# Patient Record
Sex: FEMALE | Race: BLACK OR AFRICAN AMERICAN | Employment: FULL TIME | URBAN - METROPOLITAN AREA
[De-identification: names, ages, dates, MRNs, and addresses within clinical notes are randomized per-mention and may not be internally consistent; named-entity substitution may affect disease eponyms.]

---

## 2021-01-01 ENCOUNTER — HOSPITAL ENCOUNTER (EMERGENCY)
Age: 66
End: 2021-01-24
Attending: EMERGENCY MEDICINE | Admitting: EMERGENCY MEDICINE

## 2021-01-01 ENCOUNTER — APPOINTMENT (OUTPATIENT)
Dept: GENERAL RADIOLOGY | Age: 66
End: 2021-01-01
Attending: EMERGENCY MEDICINE

## 2021-01-01 VITALS
SYSTOLIC BLOOD PRESSURE: 72 MMHG | OXYGEN SATURATION: 83 % | DIASTOLIC BLOOD PRESSURE: 61 MMHG | TEMPERATURE: 101.1 F | WEIGHT: 240 LBS

## 2021-01-01 DIAGNOSIS — R50.9 FEVER, UNSPECIFIED FEVER CAUSE: ICD-10-CM

## 2021-01-01 DIAGNOSIS — J96.01 ACUTE RESPIRATORY FAILURE WITH HYPOXIA (HCC): ICD-10-CM

## 2021-01-01 DIAGNOSIS — I46.9 CARDIOPULMONARY ARREST (HCC): Primary | ICD-10-CM

## 2021-01-01 DIAGNOSIS — R57.9 SHOCK (HCC): ICD-10-CM

## 2021-01-01 DIAGNOSIS — E87.20 METABOLIC ACIDOSIS: ICD-10-CM

## 2021-01-01 DIAGNOSIS — R06.02 SOB (SHORTNESS OF BREATH) ON EXERTION: ICD-10-CM

## 2021-01-01 LAB
ALBUMIN SERPL-MCNC: 2.9 G/DL (ref 3.4–5)
ALBUMIN/GLOB SERPL: 0.7 {RATIO} (ref 0.8–1.7)
ALP SERPL-CCNC: 78 U/L (ref 45–117)
ALT SERPL-CCNC: 96 U/L (ref 13–56)
ANION GAP SERPL CALC-SCNC: 14 MMOL/L (ref 3–18)
ARTERIAL PATENCY WRIST A: YES
AST SERPL-CCNC: 136 U/L (ref 10–38)
BASE DEFICIT BLD-SCNC: 20 MMOL/L
BASOPHILS # BLD: 0 K/UL (ref 0–0.1)
BASOPHILS NFR BLD: 0 % (ref 0–2)
BDY SITE: ABNORMAL
BILIRUB SERPL-MCNC: 0.7 MG/DL (ref 0.2–1)
BNP SERPL-MCNC: 3466 PG/ML (ref 0–900)
BODY TEMPERATURE: 98
BUN SERPL-MCNC: 20 MG/DL (ref 7–18)
BUN/CREAT SERPL: 13 (ref 12–20)
CALCIUM SERPL-MCNC: 9.7 MG/DL (ref 8.5–10.1)
CHLORIDE SERPL-SCNC: 106 MMOL/L (ref 100–111)
CK MB CFR SERPL CALC: 2.3 % (ref 0–4)
CK MB SERPL-MCNC: 2.1 NG/ML (ref 5–25)
CK SERPL-CCNC: 93 U/L (ref 26–192)
CO2 SERPL-SCNC: 19 MMOL/L (ref 21–32)
COVID-19 RAPID TEST, COVR: NOT DETECTED
CREAT SERPL-MCNC: 1.6 MG/DL (ref 0.6–1.3)
D DIMER PPP FEU-MCNC: >20 UG/ML(FEU)
DIFFERENTIAL METHOD BLD: ABNORMAL
EOSINOPHIL # BLD: 0.1 K/UL (ref 0–0.4)
EOSINOPHIL NFR BLD: 1 % (ref 0–5)
ERYTHROCYTE [DISTWIDTH] IN BLOOD BY AUTOMATED COUNT: 14 % (ref 11.6–14.5)
ETHANOL SERPL-MCNC: <3 MG/DL (ref 0–3)
GAS FLOW.O2 O2 DELIVERY SYS: ABNORMAL L/MIN
GAS FLOW.O2 SETTING OXYMISER: 20 BPM
GLOBULIN SER CALC-MCNC: 4.2 G/DL (ref 2–4)
GLUCOSE BLD STRIP.AUTO-MCNC: 389 MG/DL (ref 70–110)
GLUCOSE SERPL-MCNC: 360 MG/DL (ref 74–99)
HCO3 BLD-SCNC: 14.6 MMOL/L (ref 22–26)
HCT VFR BLD AUTO: 39.9 % (ref 35–45)
HGB BLD-MCNC: 12.9 G/DL (ref 12–16)
INR PPP: 1.3 (ref 0.8–1.2)
LACTATE BLD-SCNC: 11 MMOL/L (ref 0.4–2)
LYMPHOCYTES # BLD: 4.4 K/UL (ref 0.9–3.6)
LYMPHOCYTES NFR BLD: 25 % (ref 21–52)
MAGNESIUM SERPL-MCNC: 2.5 MG/DL (ref 1.6–2.6)
MCH RBC QN AUTO: 28.4 PG (ref 24–34)
MCHC RBC AUTO-ENTMCNC: 32.3 G/DL (ref 31–37)
MCV RBC AUTO: 87.7 FL (ref 74–97)
MONOCYTES # BLD: 1.2 K/UL (ref 0.05–1.2)
MONOCYTES NFR BLD: 7 % (ref 3–10)
NEUTS SEG # BLD: 11.7 K/UL (ref 1.8–8)
NEUTS SEG NFR BLD: 67 % (ref 40–73)
O2/TOTAL GAS SETTING VFR VENT: 1 %
PCO2 BLD: 88.2 MMHG (ref 35–45)
PEEP RESPIRATORY: 5 CMH2O
PH BLD: 6.83 [PH] (ref 7.35–7.45)
PLATELET # BLD AUTO: 114 K/UL (ref 135–420)
PMV BLD AUTO: 11.3 FL (ref 9.2–11.8)
PO2 BLD: 85 MMHG (ref 80–100)
POTASSIUM SERPL-SCNC: 4.4 MMOL/L (ref 3.5–5.5)
PROT SERPL-MCNC: 7.1 G/DL (ref 6.4–8.2)
PROTHROMBIN TIME: 16 SEC (ref 11.5–15.2)
RBC # BLD AUTO: 4.55 M/UL (ref 4.2–5.3)
SAO2 % BLD: 83 % (ref 92–97)
SARS-COV-2, COV2: NORMAL
SERVICE CMNT-IMP: ABNORMAL
SODIUM SERPL-SCNC: 139 MMOL/L (ref 136–145)
SOURCE, COVRS: NORMAL
SPECIMEN TYPE: ABNORMAL
TOTAL RESP. RATE, ITRR: 20
TROPONIN I BLD-MCNC: 0.39 NG/ML (ref 0–0.08)
TROPONIN I SERPL-MCNC: 0.77 NG/ML (ref 0–0.04)
VENTILATION MODE VENT: ABNORMAL
VOLUME CONTROL IVLC: YES
VT SETTING VENT: 450 ML
WBC # BLD AUTO: 17.5 K/UL (ref 4.6–13.2)

## 2021-01-01 PROCEDURE — 93005 ELECTROCARDIOGRAM TRACING: CPT

## 2021-01-01 PROCEDURE — 99285 EMERGENCY DEPT VISIT HI MDM: CPT

## 2021-01-01 PROCEDURE — 36556 INSERT NON-TUNNEL CV CATH: CPT

## 2021-01-01 PROCEDURE — 85379 FIBRIN DEGRADATION QUANT: CPT

## 2021-01-01 PROCEDURE — 96365 THER/PROPH/DIAG IV INF INIT: CPT

## 2021-01-01 PROCEDURE — 83605 ASSAY OF LACTIC ACID: CPT

## 2021-01-01 PROCEDURE — 80053 COMPREHEN METABOLIC PANEL: CPT

## 2021-01-01 PROCEDURE — 36600 WITHDRAWAL OF ARTERIAL BLOOD: CPT

## 2021-01-01 PROCEDURE — 96367 TX/PROPH/DG ADDL SEQ IV INF: CPT

## 2021-01-01 PROCEDURE — 87040 BLOOD CULTURE FOR BACTERIA: CPT

## 2021-01-01 PROCEDURE — 94002 VENT MGMT INPAT INIT DAY: CPT

## 2021-01-01 PROCEDURE — 74011000258 HC RX REV CODE- 258: Performed by: EMERGENCY MEDICINE

## 2021-01-01 PROCEDURE — 83735 ASSAY OF MAGNESIUM: CPT

## 2021-01-01 PROCEDURE — 87635 SARS-COV-2 COVID-19 AMP PRB: CPT

## 2021-01-01 PROCEDURE — 83880 ASSAY OF NATRIURETIC PEPTIDE: CPT

## 2021-01-01 PROCEDURE — 74011250636 HC RX REV CODE- 250/636: Performed by: EMERGENCY MEDICINE

## 2021-01-01 PROCEDURE — 71045 X-RAY EXAM CHEST 1 VIEW: CPT

## 2021-01-01 PROCEDURE — 85610 PROTHROMBIN TIME: CPT

## 2021-01-01 PROCEDURE — 51702 INSERT TEMP BLADDER CATH: CPT

## 2021-01-01 PROCEDURE — 82803 BLOOD GASES ANY COMBINATION: CPT

## 2021-01-01 PROCEDURE — 82077 ASSAY SPEC XCP UR&BREATH IA: CPT

## 2021-01-01 PROCEDURE — 74011000250 HC RX REV CODE- 250: Performed by: EMERGENCY MEDICINE

## 2021-01-01 PROCEDURE — 82553 CREATINE MB FRACTION: CPT

## 2021-01-01 PROCEDURE — 85025 COMPLETE CBC W/AUTO DIFF WBC: CPT

## 2021-01-01 PROCEDURE — U0003 INFECTIOUS AGENT DETECTION BY NUCLEIC ACID (DNA OR RNA); SEVERE ACUTE RESPIRATORY SYNDROME CORONAVIRUS 2 (SARS-COV-2) (CORONAVIRUS DISEASE [COVID-19]), AMPLIFIED PROBE TECHNIQUE, MAKING USE OF HIGH THROUGHPUT TECHNOLOGIES AS DESCRIBED BY CMS-2020-01-R: HCPCS

## 2021-01-01 PROCEDURE — 92950 HEART/LUNG RESUSCITATION CPR: CPT

## 2021-01-01 PROCEDURE — 82962 GLUCOSE BLOOD TEST: CPT

## 2021-01-01 PROCEDURE — 96375 TX/PRO/DX INJ NEW DRUG ADDON: CPT

## 2021-01-01 PROCEDURE — 84484 ASSAY OF TROPONIN QUANT: CPT

## 2021-01-01 PROCEDURE — 31500 INSERT EMERGENCY AIRWAY: CPT

## 2021-01-01 RX ORDER — EPINEPHRINE 0.1 MG/ML
INJECTION INTRACARDIAC; INTRAVENOUS
Status: DISCONTINUED
Start: 2021-01-01 | End: 2021-01-24 | Stop reason: HOSPADM

## 2021-01-01 RX ORDER — ETOMIDATE 2 MG/ML
INJECTION INTRAVENOUS
Status: COMPLETED | OUTPATIENT
Start: 2021-01-01 | End: 2021-01-01

## 2021-01-01 RX ORDER — NOREPINEPHRINE BIT/0.9 % NACL 8 MG/250ML
.5-16 INFUSION BOTTLE (ML) INTRAVENOUS
Status: DISCONTINUED | OUTPATIENT
Start: 2021-01-01 | End: 2021-01-24 | Stop reason: HOSPADM

## 2021-01-01 RX ORDER — MIDAZOLAM HYDROCHLORIDE 1 MG/ML
5 INJECTION, SOLUTION INTRAMUSCULAR; INTRAVENOUS ONCE
Status: DISCONTINUED | OUTPATIENT
Start: 2021-01-01 | End: 2021-01-24 | Stop reason: HOSPADM

## 2021-01-01 RX ORDER — EPINEPHRINE 0.1 MG/ML
INJECTION INTRACARDIAC; INTRAVENOUS
Status: COMPLETED | OUTPATIENT
Start: 2021-01-01 | End: 2021-01-01

## 2021-01-01 RX ORDER — EPINEPHRINE 1 MG/ML
INJECTION, SOLUTION, CONCENTRATE INTRAVENOUS
Status: DISCONTINUED
Start: 2021-01-01 | End: 2021-01-01 | Stop reason: WASHOUT

## 2021-01-01 RX ORDER — ACETAMINOPHEN 650 MG/1
650 SUPPOSITORY RECTAL
Status: DISCONTINUED | OUTPATIENT
Start: 2021-01-01 | End: 2021-01-24 | Stop reason: HOSPADM

## 2021-01-01 RX ORDER — SODIUM CHLORIDE 0.9 % (FLUSH) 0.9 %
5-10 SYRINGE (ML) INJECTION AS NEEDED
Status: DISCONTINUED | OUTPATIENT
Start: 2021-01-01 | End: 2021-01-24 | Stop reason: HOSPADM

## 2021-01-01 RX ORDER — CALCIUM CHLORIDE INJECTION 100 MG/ML
INJECTION, SOLUTION INTRAVENOUS
Status: COMPLETED | OUTPATIENT
Start: 2021-01-01 | End: 2021-01-01

## 2021-01-01 RX ORDER — SODIUM BICARBONATE 1 MEQ/ML
SYRINGE (ML) INTRAVENOUS
Status: COMPLETED | OUTPATIENT
Start: 2021-01-01 | End: 2021-01-01

## 2021-01-01 RX ORDER — LEVOFLOXACIN 5 MG/ML
750 INJECTION, SOLUTION INTRAVENOUS EVERY 24 HOURS
Status: DISCONTINUED | OUTPATIENT
Start: 2021-01-01 | End: 2021-01-24 | Stop reason: HOSPADM

## 2021-01-01 RX ORDER — NOREPINEPHRINE BIT/0.9 % NACL 8 MG/250ML
INFUSION BOTTLE (ML) INTRAVENOUS
Status: DISCONTINUED
Start: 2021-01-01 | End: 2021-01-24 | Stop reason: HOSPADM

## 2021-01-01 RX ADMIN — SODIUM CHLORIDE 1000 ML: 900 INJECTION, SOLUTION INTRAVENOUS at 19:21

## 2021-01-01 RX ADMIN — EPINEPHRINE 1 MG: 0.1 INJECTION, SOLUTION ENDOTRACHEAL; INTRACARDIAC; INTRAVENOUS at 19:50

## 2021-01-01 RX ADMIN — EPINEPHRINE 1 MG: 0.1 INJECTION, SOLUTION ENDOTRACHEAL; INTRACARDIAC; INTRAVENOUS at 20:45

## 2021-01-01 RX ADMIN — SODIUM BICARBONATE: 84 INJECTION, SOLUTION INTRAVENOUS at 20:40

## 2021-01-01 RX ADMIN — ETOMIDATE 20 MCG: 2 INJECTION, SOLUTION INTRAVENOUS at 19:33

## 2021-01-01 RX ADMIN — SODIUM BICARBONATE 100 MEQ: 84 INJECTION INTRAVENOUS at 19:52

## 2021-01-01 RX ADMIN — ALTEPLASE 50 MG: KIT at 21:03

## 2021-01-01 RX ADMIN — Medication 5 MCG/MIN: at 20:05

## 2021-01-01 RX ADMIN — EPINEPHRINE 1 MG: 0.1 INJECTION, SOLUTION ENDOTRACHEAL; INTRACARDIAC; INTRAVENOUS at 20:27

## 2021-01-01 RX ADMIN — EPINEPHRINE 1 MG: 0.1 INJECTION, SOLUTION ENDOTRACHEAL; INTRACARDIAC; INTRAVENOUS at 21:32

## 2021-01-01 RX ADMIN — CALCIUM CHLORIDE 1 G: 100 INJECTION, SOLUTION INTRAVENOUS at 19:32

## 2021-01-01 RX ADMIN — EPINEPHRINE 1 MG: 0.1 INJECTION, SOLUTION ENDOTRACHEAL; INTRACARDIAC; INTRAVENOUS at 20:30

## 2021-01-01 RX ADMIN — Medication 7 MCG/MIN: at 20:29

## 2021-01-01 RX ADMIN — LEVOFLOXACIN 750 MG: 5 INJECTION, SOLUTION INTRAVENOUS at 20:00

## 2021-01-01 RX ADMIN — EPINEPHRINE 1 MG: 0.1 INJECTION, SOLUTION ENDOTRACHEAL; INTRACARDIAC; INTRAVENOUS at 21:18

## 2021-01-01 RX ADMIN — Medication 8 MCG/MIN: at 20:48

## 2021-01-01 RX ADMIN — EPINEPHRINE 1 MG: 0.1 INJECTION, SOLUTION ENDOTRACHEAL; INTRACARDIAC; INTRAVENOUS at 19:53

## 2021-01-01 RX ADMIN — EPINEPHRINE 1 MG: 0.1 INJECTION, SOLUTION ENDOTRACHEAL; INTRACARDIAC; INTRAVENOUS at 20:14

## 2021-01-01 RX ADMIN — EPINEPHRINE 1 MG: 0.1 INJECTION, SOLUTION ENDOTRACHEAL; INTRACARDIAC; INTRAVENOUS at 21:29

## 2021-01-01 RX ADMIN — EPINEPHRINE 1 MG: 0.1 INJECTION, SOLUTION ENDOTRACHEAL; INTRACARDIAC; INTRAVENOUS at 20:11

## 2021-01-01 RX ADMIN — EPINEPHRINE 1 MG: 0.1 INJECTION, SOLUTION ENDOTRACHEAL; INTRACARDIAC; INTRAVENOUS at 21:15

## 2021-01-01 RX ADMIN — ALTEPLASE 50 MG: KIT at 21:20

## 2021-01-01 RX ADMIN — EPINEPHRINE 1 MG: 0.1 INJECTION, SOLUTION ENDOTRACHEAL; INTRACARDIAC; INTRAVENOUS at 19:35

## 2021-01-01 RX ADMIN — SODIUM BICARBONATE 50 MEQ: 84 INJECTION INTRAVENOUS at 19:31

## 2021-01-01 RX ADMIN — EPINEPHRINE 1 MG: 0.1 INJECTION, SOLUTION ENDOTRACHEAL; INTRACARDIAC; INTRAVENOUS at 19:30

## 2021-01-01 RX ADMIN — EPINEPHRINE 1 MCG/MIN: 1 INJECTION INTRAMUSCULAR; INTRAVENOUS; SUBCUTANEOUS at 20:37

## 2021-01-01 RX ADMIN — EPINEPHRINE 1 MG: 0.1 INJECTION, SOLUTION ENDOTRACHEAL; INTRACARDIAC; INTRAVENOUS at 21:42

## 2021-01-23 PROBLEM — I46.9 CARDIOPULMONARY ARREST (HCC): Status: ACTIVE | Noted: 2021-01-01

## 2021-01-23 PROBLEM — R65.21 SEPSIS WITH ACUTE ORGAN DYSFUNCTION AND SEPTIC SHOCK (HCC): Status: ACTIVE | Noted: 2021-01-01

## 2021-01-23 PROBLEM — R57.9 SHOCK (HCC): Status: ACTIVE | Noted: 2021-01-01

## 2021-01-23 PROBLEM — A41.9 SEPSIS WITH ACUTE ORGAN DYSFUNCTION AND SEPTIC SHOCK (HCC): Status: ACTIVE | Noted: 2021-01-01

## 2021-01-23 PROBLEM — R50.9 FEVER: Status: ACTIVE | Noted: 2021-01-01

## 2021-01-23 PROBLEM — E87.20 METABOLIC ACIDOSIS: Status: ACTIVE | Noted: 2021-01-01

## 2021-01-23 PROBLEM — R06.02 SOB (SHORTNESS OF BREATH) ON EXERTION: Status: ACTIVE | Noted: 2021-01-01

## 2021-01-23 PROBLEM — J96.01 ACUTE RESPIRATORY FAILURE WITH HYPOXIA (HCC): Status: ACTIVE | Noted: 2021-01-01

## 2021-01-24 LAB
ATRIAL RATE: 131 BPM
ATRIAL RATE: 148 BPM
ATRIAL RATE: 153 BPM
CALCULATED P AXIS, ECG09: 45 DEGREES
CALCULATED P AXIS, ECG09: 60 DEGREES
CALCULATED P AXIS, ECG09: 65 DEGREES
CALCULATED R AXIS, ECG10: -45 DEGREES
CALCULATED R AXIS, ECG10: -47 DEGREES
CALCULATED R AXIS, ECG10: -57 DEGREES
CALCULATED T AXIS, ECG11: 19 DEGREES
CALCULATED T AXIS, ECG11: 22 DEGREES
CALCULATED T AXIS, ECG11: 8 DEGREES
DIAGNOSIS, 93000: NORMAL
P-R INTERVAL, ECG05: 118 MS
P-R INTERVAL, ECG05: 122 MS
P-R INTERVAL, ECG05: 140 MS
Q-T INTERVAL, ECG07: 260 MS
Q-T INTERVAL, ECG07: 306 MS
Q-T INTERVAL, ECG07: 322 MS
QRS DURATION, ECG06: 126 MS
QRS DURATION, ECG06: 142 MS
QRS DURATION, ECG06: 146 MS
QTC CALCULATION (BEZET), ECG08: 415 MS
QTC CALCULATION (BEZET), ECG08: 451 MS
QTC CALCULATION (BEZET), ECG08: 505 MS
VENTRICULAR RATE, ECG03: 131 BPM
VENTRICULAR RATE, ECG03: 148 BPM
VENTRICULAR RATE, ECG03: 153 BPM

## 2021-01-24 NOTE — ED PROVIDER NOTES
EMERGENCY DEPARTMENT HISTORY AND PHYSICAL EXAM 
 
Date: 1/23/2021 Patient Name: Deanne Robins History of Presenting Illness Chief Complaint Patient presents with  
 Other History Provided By: Patient and EMS Deanne Robins is a 63 y.o. female who presents to the emergency department C/O generalized weakness. Patient provided by EMS for this problem. They state the patient was not providing much information but that the family said she was feeling more weak over the last day. She did state to EMS that she was having sob with walking. Their initial EKG showed potential concern for a STEMI and she was significantly tachycardic and hypotensive. States they were having difficulty obtaining a temperature as her skin was very diaphoretic and cool to touch. At this time the patient does not provide much history as she states she does not want to talk. She states she is short of breath and her mouth is very dry and wants some water. She denies any drug or alcohol use PCP: Monica Aldana DO Past History Past Medical History: 
Past Medical History:  
Diagnosis Date  Hypertension Past Surgical History: 
History reviewed. No pertinent surgical history. Family History: 
History reviewed. No pertinent family history. Social History: 
Social History Tobacco Use  Smoking status: Never Smoker  Smokeless tobacco: Never Used Substance Use Topics  Alcohol use: Not Currently  Drug use: Not on file Allergies: 
No Known Allergies Review of Systems Review of Systems Unable to perform ROS: Mental status change HENT:  
     Dry mouth Respiratory: Positive for shortness of breath. Cardiovascular: Negative for chest pain. Musculoskeletal: Positive for back pain. All other systems reviewed and are negative. All other systems reviewed and are negative. Physical Exam  
 
Vitals: 01/23/21 2136 01/23/21 2140 01/23/21 2152 01/23/21 2156 BP:  (!) 72/61 Pulse: (!) 109 90 (!) 0 (!) 0 Resp:  15  (!) 0 Temp:      
SpO2:  (!) 83% Weight:      
 
Physical Exam 
 
Nursing notes and vital signs reviewed Airway: intact Breathing: Severe distress, no cyanosis Circulation: Peripheral pulses thready but equal bilaterally Constitutional: Patient is toxic appearing in severe distress, obese appearing stated age HEENT: 
Head: Normocephalic, Atraumatic Eyes: PERRL, EOMI, No conjunctival injection Ears: external ears normal 
Nose: No rhinorrhea, external nose normal 
Throat: mucous membranes extremely dry Neck: symmetric, trachea midline, no obvious swelling, mild JVD Cardiovascular: Significant tachycardia, regular rhythm, no murmurs Lungs: Clear to ausculation bilaterally, No stridor, severe increased work of breathing and chest excursion bilaterally Abdomen: Soft, non tender, non distended, normoactive bowel sounds, No rigidity, no peritoneal signs Musculoskeletal: No evidence of obvious deformity to the back, neck or extremities, no LE edema Skin: cool extremities, diaphoretic, No obvious rashes Neuro: Patient appears agitated, patient is awake and oriented to person and place, CN 2-12 intact, normal speech, strength and sensation full and symmetric bilaterally Psychiatric: Patient has a guarded affect Diagnostic Study Results Labs - Recent Results (from the past 72 hour(s)) EKG, 12 LEAD, INITIAL Collection Time: 01/23/21  6:59 PM  
Result Value Ref Range Ventricular Rate 153 BPM  
 Atrial Rate 153 BPM  
 P-R Interval 118 ms QRS Duration 126 ms  
 Q-T Interval 260 ms QTC Calculation (Bezet) 415 ms Calculated P Axis 65 degrees Calculated R Axis -57 degrees Calculated T Axis 8 degrees Diagnosis Sinus tachycardia Left axis deviation Right bundle branch block Abnormal ECG No previous ECGs available CBC WITH AUTOMATED DIFF  
 Collection Time: 01/23/21  7:00 PM  
Result Value Ref Range WBC 17.5 (H) 4.6 - 13.2 K/uL  
 RBC 4.55 4.20 - 5.30 M/uL  
 HGB 12.9 12.0 - 16.0 g/dL HCT 39.9 35.0 - 45.0 % MCV 87.7 74.0 - 97.0 FL  
 MCH 28.4 24.0 - 34.0 PG  
 MCHC 32.3 31.0 - 37.0 g/dL  
 RDW 14.0 11.6 - 14.5 % PLATELET 952 (L) 682 - 420 K/uL MPV 11.3 9.2 - 11.8 FL  
 NEUTROPHILS 67 40 - 73 % LYMPHOCYTES 25 21 - 52 % MONOCYTES 7 3 - 10 % EOSINOPHILS 1 0 - 5 % BASOPHILS 0 0 - 2 %  
 ABS. NEUTROPHILS 11.7 (H) 1.8 - 8.0 K/UL  
 ABS. LYMPHOCYTES 4.4 (H) 0.9 - 3.6 K/UL  
 ABS. MONOCYTES 1.2 0.05 - 1.2 K/UL  
 ABS. EOSINOPHILS 0.1 0.0 - 0.4 K/UL  
 ABS. BASOPHILS 0.0 0.0 - 0.1 K/UL  
 DF AUTOMATED METABOLIC PANEL, COMPREHENSIVE Collection Time: 01/23/21  7:00 PM  
Result Value Ref Range Sodium 139 136 - 145 mmol/L Potassium 4.4 3.5 - 5.5 mmol/L Chloride 106 100 - 111 mmol/L  
 CO2 19 (L) 21 - 32 mmol/L Anion gap 14 3.0 - 18 mmol/L Glucose 360 (H) 74 - 99 mg/dL BUN 20 (H) 7.0 - 18 MG/DL Creatinine 1.60 (H) 0.6 - 1.3 MG/DL  
 BUN/Creatinine ratio 13 12 - 20 GFR est AA 39 (L) >60 ml/min/1.73m2 GFR est non-AA 33 (L) >60 ml/min/1.73m2 Calcium 9.7 8.5 - 10.1 MG/DL Bilirubin, total 0.7 0.2 - 1.0 MG/DL  
 ALT (SGPT) 96 (H) 13 - 56 U/L  
 AST (SGOT) 136 (H) 10 - 38 U/L Alk. phosphatase 78 45 - 117 U/L Protein, total 7.1 6.4 - 8.2 g/dL Albumin 2.9 (L) 3.4 - 5.0 g/dL Globulin 4.2 (H) 2.0 - 4.0 g/dL A-G Ratio 0.7 (L) 0.8 - 1.7 D DIMER Collection Time: 01/23/21  7:00 PM  
Result Value Ref Range D DIMER >20.00 (H) <0.46 ug/ml(FEU) PROTHROMBIN TIME + INR Collection Time: 01/23/21  7:00 PM  
Result Value Ref Range Prothrombin time 16.0 (H) 11.5 - 15.2 sec INR 1.3 (H) 0.8 - 1.2 NT-PRO BNP Collection Time: 01/23/21  7:00 PM  
Result Value Ref Range NT pro-BNP 3,466 (H) 0 - 900 PG/ML  
ETHYL ALCOHOL  Collection Time: 01/23/21  7:00 PM  
 Result Value Ref Range ALCOHOL(ETHYL),SERUM <3 0 - 3 MG/DL MAGNESIUM Collection Time: 01/23/21  7:00 PM  
Result Value Ref Range Magnesium 2.5 1.6 - 2.6 mg/dL CARDIAC PANEL,(CK, CKMB & TROPONIN) Collection Time: 01/23/21  7:00 PM  
Result Value Ref Range CK - MB 2.1 <3.6 ng/ml CK-MB Index 2.3 0.0 - 4.0 % CK 93 26 - 192 U/L Troponin-I, QT 0.77 (H) 0.0 - 0.045 NG/ML  
POC TROPONIN-I Collection Time: 01/23/21  7:01 PM  
Result Value Ref Range Troponin-I (POC) 0.39 (HH) 0.00 - 0.08 ng/mL GLUCOSE, POC Collection Time: 01/23/21  7:22 PM  
Result Value Ref Range Glucose (POC) 389 (H) 70 - 110 mg/dL POC LACTIC ACID Collection Time: 01/23/21  7:29 PM  
Result Value Ref Range Lactic Acid (POC) 11.00 (HH) 0.40 - 2.00 mmol/L  
EKG, 12 LEAD, SUBSEQUENT Collection Time: 01/23/21  7:46 PM  
Result Value Ref Range Ventricular Rate 148 BPM  
 Atrial Rate 148 BPM  
 P-R Interval 122 ms QRS Duration 142 ms  
 Q-T Interval 322 ms QTC Calculation (Bezet) 505 ms Calculated P Axis 60 degrees Calculated R Axis -45 degrees Calculated T Axis 22 degrees Diagnosis Sinus tachycardia Left axis deviation Right bundle branch block T wave abnormality, consider inferior ischemia Abnormal ECG When compared with ECG of 23-JAN-2021 18:59, 
ST no longer depressed in Anterior leads EKG, 12 LEAD, SUBSEQUENT Collection Time: 01/23/21  8:18 PM  
Result Value Ref Range Ventricular Rate 131 BPM  
 Atrial Rate 131 BPM  
 P-R Interval 140 ms QRS Duration 146 ms  
 Q-T Interval 306 ms QTC Calculation (Bezet) 451 ms Calculated P Axis 45 degrees Calculated R Axis -47 degrees Calculated T Axis 19 degrees Diagnosis Sinus tachycardia Right bundle branch block Left anterior fascicular block Bifascicular block Abnormal ECG When compared with ECG of 23-JAN-2021 19:46, No significant change was found POC G3  
 Collection Time: 01/23/21  8:22 PM  
Result Value Ref Range Device: VENT    
 FIO2 (POC) 1.0 %  
 pH (POC) 6.83 (LL) 7.35 - 7.45    
 pCO2 (POC) 88.2 (H) 35.0 - 45.0 MMHG  
 pO2 (POC) 85 80 - 100 MMHG  
 HCO3 (POC) 14.6 (L) 22 - 26 MMOL/L  
 sO2 (POC) 83 (L) 92 - 97 % Base deficit (POC) 20 mmol/L Mode ASSIST CONTROL Tidal volume 450 ml Set Rate 20 bpm  
 PEEP/CPAP (POC) 5 cmH2O Allens test (POC) YES Total resp. rate 20 Site RIGHT RADIAL Patient temp. 98.0 Specimen type (POC) ARTERIAL Performed by Gokul Gaytan Volume control YES    
SARS-COV-2 Collection Time: 01/23/21  9:00 PM  
Result Value Ref Range SARS-CoV-2 Please find results under separate order COVID-19 RAPID TEST Collection Time: 01/23/21  9:00 PM  
Result Value Ref Range Specimen source Nasopharyngeal    
 COVID-19 rapid test Not detected NOTD Radiologic Studies -  
XR CHEST PORT Final Result New right internal jugular central line with tip overlying the  
region the superior vena cava. Patient is rotated. There is prominence of the right hilum concerning for underlying adenopathy or  
mass. Advise CT of the chest for further evaluation. No pneumothorax. XR CHEST PORT Final Result No acute process CT Results  (Last 48 hours) None CXR Results  (Last 48 hours) 01/23/21 2033  XR CHEST PORT Final result Impression:  New right internal jugular central line with tip overlying the  
region the superior vena cava. Patient is rotated. There is prominence of the right hilum concerning for underlying adenopathy or  
mass. Advise CT of the chest for further evaluation. No pneumothorax. Narrative:  EXAM:  AP Portable Chest X-ray 1 view INDICATION: Central line placement COMPARISON: January 23, 2021 at 7:38 PM  
   
_______________ FINDINGS: Patient is rotated to the right. There is a right internal jugular  
central line with tip overlying the junction of the superior vena cava. There is  
an endotracheal tube overlying the trachea with tip at the level of the  
clavicles. Heart size appears enlarged partly due to AP magnification. There is enlargement  
of the right hilum concerning for adenopathy or underlying mass. Advise CT of  
the chest for further evaluation. Lungs are clear of active disease. There are  
no pleural effusions. No acute osseous findings. ________________  
   
   
  
 01/23/21 2002  XR CHEST PORT Final result Impression:  No acute process Narrative:  EXAM:  AP Portable Chest X-ray 1 view INDICATION: EKG showed STEMI  
   
COMPARISON: None  
   
_______________ FINDINGS: There is an endotracheal tube overlying the trachea 1.7 cm above the  
steve. There is a patch overlying the right hemithorax. Patient is rotated to  
the right. Heart and mediastinal contours are within normal limits for portable radiograph. Lungs are clear of active disease. There are no pleural effusions. No acute  
osseous findings. ________________ Medications given in the ED- Medications  
acetaminophen (TYLENOL) suppository 650 mg (has no administration in time range)  
sodium chloride (NS) flush 5-10 mL (has no administration in time range)  
sodium chloride 0.9 % bolus infusion 1,000 mL (0 mL IntraVENous IV Completed 1/23/21 2021) Followed by  
sodium chloride 0.9 % bolus infusion 1,000 mL (has no administration in time range) Followed by  
sodium chloride 0.9 % bolus infusion 1,000 mL (has no administration in time range) Followed by  
sodium chloride 0.9 % bolus infusion 267 mL (has no administration in time range)  
piperacillin-tazobactam (ZOSYN) 4.5 g in 0.9% sodium chloride (MBP/ADV) 100 mL MBP (has no administration in time range)   Followed by  
 piperacillin-tazobactam (ZOSYN) 4.5 g in 0.9% sodium chloride (MBP/ADV) 100 mL MBP (has no administration in time range) levoFLOXacin (LEVAQUIN) 750 mg in D5W IVPB (0 mg IntraVENous Stopped 1/23/21 2130)  
sodium bicarbonate (8.4%) 150 mEq in 0.45% sodium chloride 1,000 mL infusion ( IntraVENous Stopped 1/23/21 2154) midazolam (PF) (VERSED) injection 5 mg (has no administration in time range) dexmedeTOMidine (PRECEDEX) 400 mcg in 0.9% sodium chloride 100 mL infusion (has no administration in time range) NOREPINephrine (LEVOPHED) 8 mg in 0.9% NS 250ml infusion (0 mcg/min IntraVENous Stopped 1/23/21 2154) alteplase (ACTIVASE) infusion for PE or AMI (has no administration in time range) EPINEPHrine (ADRENALIN) 8 mg in 0.9% sodium chloride 250 mL infusion (0 mcg/min IntraVENous Stopped 1/23/21 2154) EPINEPHrine (ADRENALIN) 0.1 mg/mL syringe (1 mg IntraVENous Given 1/23/21 2118)  
sodium bicarbonate 8.4 % (1 mEq/mL) injection (100 mEq IntraVENous Given 1/23/21 1952) etomidate (AMIDATE) 2 mg/mL injection (20 mcg IntraVENous Given 1/23/21 1933) calcium chloride injection (1 g IntraVENous Given 1/23/21 1932) alteplase (ACTIVASE) infusion for PE or AMI (  Stopped 1/23/21 2154) EPINEPHrine (ADRENALIN) 0.1 mg/mL syringe (1 mg IntraVENous Given 1/23/21 2142) Medical Decision Making I reviewed the vital signs, available nursing notes, past medical history, past surgical history, family history and social history. Vital Signs interpretation- I have reviewed the patient's vital signs. Pulse Oximetry interpretation - 88% on 100% fio2 NRB although the pulse ox is difficult to measure at this time. Will obtain ABG Cardiac Monitor interpretation: 
Rate: 153 bpm 
Rhythm: sinu EKG interpretation: (Preliminary) EKG interpretation by Dr. Karma Looney Rate 153 sinus tachycardia, left axis deviation, right bundle branch block, nonspecific ST changes with depressions in the lateral and inferior leads and some elevation in the anterior Records Reviewed: Nursing Notes and Old Medical Records Procedures: 
Critical Care Performed by: Jesse Davis DO Authorized by: Jesse Davis DO  
 
Critical care provider statement:  
  Critical care time (minutes):  180 Critical care start time:  1/23/2021 7:00 PM 
  Critical care end time:  1/23/2021 10:00 PM 
  Critical care time was exclusive of:  Separately billable procedures and treating other patients Critical care was necessary to treat or prevent imminent or life-threatening deterioration of the following conditions:  Cardiac failure, respiratory failure, shock and metabolic crisis Critical care was time spent personally by me on the following activities:  Blood draw for specimens, development of treatment plan with patient or surrogate, ordering and performing treatments and interventions, ordering and review of laboratory studies, ordering and review of radiographic studies, pulse oximetry, re-evaluation of patient's condition, ventilator management, obtaining history from patient or surrogate, examination of patient, evaluation of patient's response to treatment and discussions with consultants Central Line 
 
Date/Time: 1/23/2021 11:21 PM 
Performed by: Jesse Davis DO Authorized by: Chaitanya Fernandez Consent:  
  Consent obtained:  Emergent situation Pre-procedure details:  
  Hand hygiene: Hand hygiene performed prior to insertion Sterile barrier technique: All elements of maximal sterile technique followed Skin preparation:  ChloraPrep Procedure details: Location:  R internal jugular Patient position:  Flat Procedural supplies:  Triple lumen Catheter size:  7.5 Fr Landmarks identified: yes Ultrasound guidance: yes Sterile ultrasound techniques: Sterile gel and sterile probe covers were used Number of attempts:  1 Successful placement: yes Post-procedure details: Post-procedure:  Dressing applied and line sutured Assessment:  Blood return through all ports, no pneumothorax on x-ray and placement verified by x-ray Patient tolerance of procedure: Tolerated well, no immediate complications Intubation Date/Time: 1/23/2021 11:21 PM 
Performed by: Governor Elsy DO Authorized by: Alberta Regalado Consent:  
  Consent obtained:  Emergent situation Pre-procedure details:  
  Patient status:  Unresponsive Paralytics:  None Procedure details:  
  Preoxygenation:  Bag valve mask CPR in progress: yes Intubation method:  Oral 
  Oral intubation technique:  Video-assisted Laryngoscope blade: Mac 4 Tube size (mm):  7.5 Tube type:  Cuffed Number of attempts:  2 Ventilation between attempts: yes Cricoid pressure: yes Tube visualized through cords: yes Placement assessment: ETT to lip:  23 ETT to teeth:  22 Tube secured with:  ETT sharma Breath sounds:  Equal 
  Placement verification: chest rise, condensation, CXR verification and ETCO2 detector Chest x-ray findings: ET tube slightly superior, it was pushed down 2 cm. Post-procedure details:  
  Patient tolerance of procedure: Tolerated well, no immediate complications Bedside US Date/Time: 1/23/2021 11:23 PM 
Performed by: Governor Elsy DO Authorized by: Alberta Regalado Emergent situation Performed by: Attending Type of procedure: Focused cardiac (Echo) Left leg: Indications:  Cardiac arrest 
  Parasternal long axis:  Limited Parasternal short axis:  Limited Apical four-chamber:  Limited Pericardial effusion:  Absent Global Ventricular Function:  Severely reduced Right Ventricular Size:  Indeterminate Cardioversion, electrical 
 
 Date/Time: 1/23/2021 11:24 PM 
Performed by: Elvia Shrestha DO Authorized by: Dinora Gaytan Consent:  
  Consent obtained:  Emergent situation Pre-procedure details:  
  Cardioversion basis:  Emergent Rhythm:  Ventricular tachycardia Electrode placement:  Anterior-lateral 
Attempt one:  
  Cardioversion mode:  Asynchronous Waveform:  Biphasic Shock (Joules):  200 Shock outcome:  Conversion to pulseless electrical activity Post-procedure details:  
  Patient status:  Unresponsive ABG Date/Time: 1/23/2021 11:24 PM 
Performed by: Elvia Shrestha DO Authorized by: Elvia Shrestha DO  
 
 
 
 
ED Course & MDM:  
initially code stemi was called by the physician prior to my arrival and discussion was made with cardiology who evaluated the EKG and determined that she was not in an acute STEMI based on their EKG interpretation and the lack of her chest pain. There was difficulty obtaining good temperature due to her cool extremities. A rectal temperature was done for temperature of 101. Combined with her tachycardia code sepsis was initiated. Will give 30 cc/kg fluid bolus. We will start broad-spectrum antibiotics vancomycin Zosyn and Levaquin and keep the patient on supplemental oxygen. Potential differentials for this patient include but not limited to pulmonary embolism, aortic dissection or aneurysm, NSTEMI, heart failure, toxin/drug induced, infection. Initial troponin POC noted to be 0.39. At 1928 patient became unresponsive. She had agonal breathing and her heart rate became significantly more bradycardic rate between 60 and 70 no pulses were palpated in the carotid or femoral region. ACLS protocol was initiated 1930 1 mg epi given 1931 1 amp of bicarb given 1932 1 g of calcium given 1934 patient's rhythm changed from PEA, sinus tach around 130, to V. tach. She was defibrillated at 200 J with return to PEA sinus tach 1933 20 mg of etomidate given for intubation 1935 1 mg of epinephrine given Intubation was completed at 1111 [facility] after multiple attempts 1936 return of spontaneous circulation at sinus tach EKG #2 shows rate 148 sinus tachycardia with left axis deviation right bundle branch block and the similar ST changes with depressions in lateral and inferior leads with nonspecific elevation in aVR She is on 100% FiO2 and PEEP of 10 with a rate of 30. Her ABG shows significant acidosis. 1950 patient started to become hypoxic bradycardic and hypotensive 1952 1 amp of bicarb given 1953 1 mg epinephrine push 1954 pulses were lost and ACLS protocol was initiated, PEA arrest noted 1955 1 mg epinephrine push 1957 return of spontaneous circulation and sinus tachycardia Central line was placed and patient was started on norepinephrine drip 2011 similar process occurred and pulses were lost ACLS protocol was initiated with PEA arrest, 1 mg of epinephrine given push 2014 1 mg of epinephrine given push 2015 return spontaneous circulation sinus tach 
 
 
2026 patient lost pulses again, similar appearance where she would become more hypoxic/bradycardic and hypotensive. The ET tube is in appropriate position based on chest x-ray and during bag mask ventilation we were able to get her pulse ox to improve with chest compressions but did not seem to last 
2027 1 mg epinephrine given 2029 Levophed drip increased to 7 mcg 2030 1mg epinephrine given 2030 return of spontaneous circulation sinus tachycardia 2037 patient was started on an epinephrine drip and bicarbonate drip started at 2040 Patient lost pulses again at 2044 ACLS protocol was initiated at PEA arrest 
2045 1 mg epinephrine given and return of spontaneous circulation was achieved 2048 Levophed drip increased as well as epinephrine drip increased for her persistent hypotension CONSULT NOTE:  
Dr. Rob Piper spoke with Dr. Mona Santana Specialty: pulm cc Discussed pt's hx, disposition, and available diagnostic and imaging results over the telephone. Reviewed care plans. Stated it would be difficult to make the call to give TPA for potential PE given the lack of information. At this time I had a long talk with the patient's son Valerie Zavaleta at bedside. Stated at this point the patient is on high amounts of life support including ventilation, bicarbonate drip, Levophed drip, epinephrine drip. I did state that I was potentially concerned if the patient could be having a massive pulmonary embolism and did discuss with him the potential treatment of giving TPA. He is agreeable with whatever treatment we deem necessary for his mother Pulses were lost again at 2101 ACLS protocol was initiated with  PEA arrest 
2103 TPA was given 50 mg over 2 minutes 2106 return of spontaneous circulation with sinus tachycardia 2112 epinephrine drip increased due to persistent hypotension 2112 pulses were lost again and ACLS protocol was initiated. During these times it was difficult to maintain adequate oxygenation on ventilator. During bag mask ventilation we were able to get it to may be mid 80% although it was difficult to get a good read on the pulse oximeter. 2115 epinephrine given 1 mg push 
2118 1 mg epinephrine push 
2120 the second dose 50 mg TPA was started to be given over 1 hour 2120 return of spontaneous circulation at sinus tachycardia 2029 pulses were lost again ACLS protocol was initiated, this time her rhythm was asystole 2130 epinephrine push given 1 mg 
2132 epinephrine push given 1 mg Levophed drip was increased to 10 mcg 2135 return of spontaneous circulation sinus tach 
 
2141 lost pulses again ACLS protocol was initiated under my supervision in asystole 2142 epinephrine push given 1 mg 2150 patient still in asystole, ultrasound was placed over the patient's heart and very trace twitching movement was seen, at this point it was difficult to maintain her pulse ox above even 40 to 50% on bag mask ventilation. Patient has no spontaneous breathing at this time 2152 cardiac standstill was noted on the ultrasound, no spontaneous breathing, pupils are fixed and dilated, skin is cool to touch. Time of death was called at 2152 Altogether the patient had 9 rounds of ACLS protocol driven resuscitation over the course of 2-1/2 hours. I did speak with the son Davy Coleman and the patient's ex- Phuong Hernández regarding the patient's demise Diagnosis and Disposition Critical Care Time: 10:32 PM 
I have spent 180 minutes of critical care time involved in lab review, consultations with specialist, family decision-making, and documentation. During this entire length of time I was immediately available to the patient. Critical Care: The reason for providing this level of medical care for this critically ill patient was due a critical illness that impaired one or more vital organ systems such that there was a high probability of imminent or life threatening deterioration in the patients condition. This care involved high complexity decision making to assess, manipulate, and support vital system functions, to treat this degreee vital organ system failure and to prevent further life threatening deterioration of the patients condition. CLINICAL IMPRESSION: 
 
1. Cardiopulmonary arrest ([address] 2. SOB (shortness of breath) on exertion 3. Fever, unspecified fever cause 4. Shock ([address] 5. Acute respiratory failure with hypoxia ([address] 6. Metabolic acidosis CLINICAL IMPRESSION: 
 
1. Cardiopulmonary arrest ([address] 2. SOB (shortness of breath) on exertion 3. Fever, unspecified fever cause 4. Shock ([address] 5. Acute respiratory failure with hypoxia ([address] 6. Metabolic acidosis Please note that this dictation was completed with [employer], the computer voice recognition software. Quite often unanticipated grammatical, syntax, homophones, and other interpretive errors are inadvertently transcribed by the computer software. Please disregard these errors. Please excuse any errors that have escaped final proofreading.

## 2021-01-24 NOTE — PROGRESS NOTES
BEREAVEMENT NOTE  responded to the death of Mat Loya, who is a 63 y.o., female, offering Spiritual Care to patient and family, see flow sheets for interventions. - Family requested an autopsy. Informed family clinical team would follow up with more specifics. Patient to go to [facility]  to provide initial information. Date of Death: 21 Time of Death: 1252 Pronounced by: [place] [place] Extended Emergency Contact Information Primary Emergency Contact: Gordon Machuca Mobile Phone: [phone] Relation: Son Preferred language: ENGLISH  needed? No 
Secondary Emergency Contact: Polo Martinez Mobile Phone: [phone] Relation: Spouse Advance Care Plan: N/A [place] Status: Unknown YES      NO  UNKNOWN Life Net   []        [x]    [] [facility]   [] [x] [] Medical Examiner  []        [x]  [] Going to [place]  [x]        [] [] Autopsy   [x]        []        [] Sympathy Card  [x]        [] Bereavement Materials  [x]        [] Business Card Provided  []        [x] Organ Donor/Anatomical Gift Preference: N/A  Home 
Name: MORENO RUBIO Phone: [phone] Chaplains will continue to follow family and will provide spiritual care as needed. Vero Gaston [address] [place] [address],[address] Ph. [phone]

## 2021-01-26 LAB — SARS-COV-2, COV2NT: NOT DETECTED

## 2021-01-30 LAB
BACTERIA SPEC CULT: NORMAL
BACTERIA SPEC CULT: NORMAL
SERVICE CMNT-IMP: NORMAL
SERVICE CMNT-IMP: NORMAL

## 2021-08-29 NOTE — ED TRIAGE NOTES
Patient arrives from home via EMS c/o shortnes of breath since yesterday and not feeling well. EMS EKG showed STEMI in the field. EKG on site shows sinus tach. Patient not answering questions just keeps saying she is thirsty. MD to bedside. 36.3